# Patient Record
Sex: MALE | Race: WHITE | NOT HISPANIC OR LATINO | URBAN - METROPOLITAN AREA
[De-identification: names, ages, dates, MRNs, and addresses within clinical notes are randomized per-mention and may not be internally consistent; named-entity substitution may affect disease eponyms.]

---

## 2024-11-06 ENCOUNTER — APPOINTMENT (OUTPATIENT)
Dept: PRIMARY CARE | Facility: CLINIC | Age: 19
End: 2024-11-06
Payer: COMMERCIAL

## 2024-11-06 VITALS
DIASTOLIC BLOOD PRESSURE: 70 MMHG | RESPIRATION RATE: 16 BRPM | BODY MASS INDEX: 18.62 KG/M2 | WEIGHT: 133 LBS | TEMPERATURE: 97.5 F | HEART RATE: 92 BPM | SYSTOLIC BLOOD PRESSURE: 110 MMHG | HEIGHT: 71 IN

## 2024-11-06 DIAGNOSIS — Z00.01 ENCOUNTER FOR GENERAL ADULT MEDICAL EXAMINATION WITH ABNORMAL FINDINGS: Primary | ICD-10-CM

## 2024-11-06 DIAGNOSIS — M25.462 SWELLING OF JOINT OF LEFT KNEE: ICD-10-CM

## 2024-11-06 DIAGNOSIS — J45.20 MILD INTERMITTENT ASTHMA WITHOUT COMPLICATION (HHS-HCC): ICD-10-CM

## 2024-11-06 DIAGNOSIS — L25.5 RHUS DERMATITIS: ICD-10-CM

## 2024-11-06 DIAGNOSIS — Z30.09 VASECTOMY EVALUATION: ICD-10-CM

## 2024-11-06 PROBLEM — J45.909 ASTHMA: Status: ACTIVE | Noted: 2018-06-07

## 2024-11-06 PROCEDURE — 3008F BODY MASS INDEX DOCD: CPT | Performed by: FAMILY MEDICINE

## 2024-11-06 PROCEDURE — 4004F PT TOBACCO SCREEN RCVD TLK: CPT | Performed by: FAMILY MEDICINE

## 2024-11-06 PROCEDURE — 99214 OFFICE O/P EST MOD 30 MIN: CPT | Performed by: FAMILY MEDICINE

## 2024-11-06 RX ORDER — METHYLPREDNISOLONE 4 MG/1
TABLET ORAL
Qty: 21 TABLET | Refills: 0 | Status: SHIPPED | OUTPATIENT
Start: 2024-11-06 | End: 2024-11-13

## 2024-11-06 RX ORDER — CLOTRIMAZOLE AND BETAMETHASONE DIPROPIONATE 10; .64 MG/G; MG/G
1 CREAM TOPICAL 2 TIMES DAILY
Qty: 45 G | Refills: 1 | Status: SHIPPED | OUTPATIENT
Start: 2024-11-06 | End: 2024-12-04

## 2024-11-06 RX ORDER — ALBUTEROL SULFATE 90 UG/1
2 INHALANT RESPIRATORY (INHALATION) EVERY 4 HOURS PRN
Qty: 8.5 G | Refills: 11 | Status: SHIPPED | OUTPATIENT
Start: 2024-11-06 | End: 2025-11-06

## 2024-11-06 NOTE — ASSESSMENT & PLAN NOTE
Orders:    albuterol (ProAir HFA) 90 mcg/actuation inhaler; Inhale 2 puffs every 4 hours if needed for wheezing or shortness of breath.

## 2024-11-06 NOTE — PATIENT INSTRUCTIONS
USE [facility].  CALL FOR NEEDS [phone].   KEEP ON MEDICATIONS  KEEP SPECIALTY APPOINTMENTS.    USE LOTRISONE ANTI FUNGAL STEROID CREAM TO SOOTHE ITCH AND RASH.    DO NOT SCRATCH YOU MAY RUB.    LUKE WARM BATH AND DOVE SOAP MINIMAL AMOUNT.    SKIN EMOLLIENTS.    CALL IF NOT BETTER IN 1 WEEK:  11/13/24.    GET LEFT KNEE X RAYS: TENDER SWELLING MEDIAL LEFT FEMUR AT KNEE ? BURSITIS or LIPOMA or TENDONOPATHY   RETURN IN 2 WEEKS

## 2024-11-06 NOTE — PROGRESS NOTES
"Subjective   Patient ID: Jesus Lyles is a 19 y.o. male who presents for Poison Ivy (ARM AND STOMACH ) and Knee Injury (LEFT KNEE PAIN ).    Poison Claire       Poison Ivy (ARM AND STOMACH ) and Knee Injury (LEFT KNEE PAIN ).  SXS X 1 WEEK NOW AND CLEARING BUT SXS PERSIST AT LEFT ABD TO LEFT THIGH & GROIN.  ALSO IN INGUINAL SKIN FOLD.    BLANCHING SALMON PINK SKIN NOT DERMATOMAL AND SERPENTINE MARGIN.      STRESSOR ON INTERVAL 10/28/2024:  TWINS BORN AND NOW MOM AND BABIES ARE DOING OK.      APOLLOS BOY AND ATLAS GIRL.      MEDIAL LEFT KNEE MONTHS OF SWELLING AND TENDERNESS OVER THE DISTAL SARTORIUS MUSCLE APART FORM THE KNEE SO LESS LIKELY BURSAE.  TRY MEDROL DOSEPAK AND SOFT TISSUE  ULTRASOUND IMAGING NOW.      Review of Systems   All other systems reviewed and are negative.      Objective   /70   Pulse 92   Temp 36.4 °C (97.5 °F) (Temporal)   Resp 16   Ht 1.803 m (5' 11\")   Wt 60.3 kg (133 lb)   BMI 18.55 kg/m²     Physical Exam  Vitals and nursing note reviewed.   Constitutional:       Appearance: Normal appearance.      Comments: RASH AS ABOVE AND PT O/W APPEARS PALE FACED AND GAUNT.     HENT:      Head: Normocephalic.      Right Ear: Tympanic membrane, ear canal and external ear normal.      Left Ear: Tympanic membrane, ear canal and external ear normal.      Nose: Nose normal.      Mouth/Throat:      Mouth: Mucous membranes are moist.      Pharynx: Oropharynx is clear.   Eyes:      Conjunctiva/sclera: Conjunctivae normal.      Pupils: Pupils are equal, round, and reactive to light.   Cardiovascular:      Rate and Rhythm: Normal rate and regular rhythm.      Pulses: Normal pulses.      Heart sounds: Normal heart sounds.   Pulmonary:      Effort: Pulmonary effort is normal.      Breath sounds: Normal breath sounds.   Abdominal:      General: Bowel sounds are normal.      Palpations: Abdomen is soft.   Musculoskeletal:         General: Normal range of motion.      Cervical back: Normal range of " motion and neck supple.      Comments: PLUM SIZED TENDER SWELLING MEDIAL LEFT KNEE AT INSERTION OF SARTORIUS NEAR PES ENCERINUS   Skin:     General: Skin is warm and dry.   Neurological:      General: No focal deficit present.      Mental Status: Mental status is at baseline.   Psychiatric:         Mood and Affect: Mood normal.         Behavior: Behavior normal.         Thought Content: Thought content normal.         Judgment: Judgment normal.         Assessment/Plan   Assessment & Plan  Mild intermittent asthma without complication ([facility]-H[facility])    Orders:    albuterol (ProAir HFA) 90 mcg/actuation inhaler; Inhale 2 puffs every 4 hours if needed for wheezing or shortness of breath.    Encounter for general adult medical examination with abnormal findings    Orders:    Lipid panel; Future    Urinalysis with Reflex Microscopic; Future    TSH with reflex to Free T4 if abnormal; Future    Comprehensive Metabolic Panel; Future    CBC and Auto Differential; Future    Rhus dermatitis    Orders:    clotrimazole-betamethasone (Lotrisone) cream; Apply 1 Application topically 2 times a day for 28 days.    Follow Up In Primary Care - Established; Future    Swelling of joint of left knee    Orders:    XR knee left 3 views; Future    methylPREDNISolone (Medrol Dospak) 4 mg tablets; Take as directed on package.    Follow Up In Primary Care - Established; Future    Vasectomy evaluation    Orders:    Referral to Urology; Future

## 2024-11-21 ENCOUNTER — APPOINTMENT (OUTPATIENT)
Dept: PRIMARY CARE | Facility: CLINIC | Age: 19
End: 2024-11-21
Payer: COMMERCIAL

## 2024-12-06 ENCOUNTER — APPOINTMENT (OUTPATIENT)
Dept: PRIMARY CARE | Facility: CLINIC | Age: 19
End: 2024-12-06
Payer: COMMERCIAL

## 2025-06-13 ENCOUNTER — OFFICE VISIT (OUTPATIENT)
Dept: PRIMARY CARE | Facility: CLINIC | Age: 20
End: 2025-06-13
Payer: COMMERCIAL

## 2025-06-13 VITALS
RESPIRATION RATE: 16 BRPM | SYSTOLIC BLOOD PRESSURE: 112 MMHG | HEIGHT: 71 IN | DIASTOLIC BLOOD PRESSURE: 70 MMHG | TEMPERATURE: 97.7 F | WEIGHT: 135 LBS | BODY MASS INDEX: 18.9 KG/M2 | HEART RATE: 76 BPM

## 2025-06-13 DIAGNOSIS — J45.20 MILD INTERMITTENT ASTHMA WITHOUT COMPLICATION (HHS-HCC): ICD-10-CM

## 2025-06-13 DIAGNOSIS — Z13.220 NEED FOR LIPID SCREENING: ICD-10-CM

## 2025-06-13 DIAGNOSIS — Z79.1 NSAID LONG-TERM USE: ICD-10-CM

## 2025-06-13 DIAGNOSIS — M70.52 SUPRAPATELLAR BURSITIS OF LEFT KNEE: Primary | ICD-10-CM

## 2025-06-13 PROCEDURE — 3008F BODY MASS INDEX DOCD: CPT | Performed by: FAMILY MEDICINE

## 2025-06-13 PROCEDURE — 99213 OFFICE O/P EST LOW 20 MIN: CPT | Performed by: FAMILY MEDICINE

## 2025-06-13 RX ORDER — MELOXICAM 15 MG/1
15 TABLET ORAL DAILY
Qty: 14 TABLET | Refills: 0 | Status: SHIPPED | OUTPATIENT
Start: 2025-06-13 | End: 2025-06-27

## 2025-06-13 RX ORDER — OMEPRAZOLE 20 MG/1
20 CAPSULE, DELAYED RELEASE ORAL DAILY
Qty: 14 CAPSULE | Refills: 0 | Status: SHIPPED | OUTPATIENT
Start: 2025-06-13 | End: 2025-06-27

## 2025-06-13 RX ORDER — ALBUTEROL SULFATE 90 UG/1
2 INHALANT RESPIRATORY (INHALATION) EVERY 4 HOURS PRN
Qty: 8.5 G | Refills: 11 | Status: SHIPPED | OUTPATIENT
Start: 2025-06-13 | End: 2026-06-13

## 2025-06-13 NOTE — PATIENT INSTRUCTIONS
USE [facility].  CALL FOR NEEDS [phone].   KEEP ON MEDICATIONS  KEEP SPECIALTY APPOINTMENTS.    I DECIDED NOT TO INJECT YOUR LEFT KNEE INSTEAD WILL ORDER:  1-- ANTI INFLAMMATORIES: MELOXICAM 15 MG IS AN NSAID. LIKE NAPROXEN OR ASPIRIN IBUPROFEN, MOTRIN. TAKE NO OTHER NSAID WITH THIS.  TYLENOL IS OK.  TAKE DAILY WITH FOOD FOR 2 WEEKS FOR LEFT KNEE PAIN AND SWELLING RELIEF.   21--NEOPRENE LEFT KNEE SLEEVE  RETURN IN 2 WEEKS FOR FOLLOW UP   RETURN SOONER FOR ANY WORSE PAIN.

## 2025-06-13 NOTE — PROGRESS NOTES
"Subjective   Patient ID: Jesus Lyles is a 20 y.o. male who presents for Joint Swelling (LEFT KNEE SWELLING AND PAIN).    HPI   Joint Swelling (LEFT KNEE SWELLING AND PAIN).    SXS STARTED:  AWHILE NOW - ? YRS AND CANNOT BEND DOWN NOW OR BEND IT, NO ACCIDENTS.  NO RECENT INJURIES.  JUST POPPED UP OUT OF NO WHERE.    NO RELIEVERS.    WHEN DTR HIT IS PAIN SHOOTS THRU ME.      SEPT 2024 VASECTOMY REFERRAL TO DR ROUSSEAU NOT DONE.  INSURANCE MESSED UP AND NO MRI KNEE EITHER.      1 YR OLD DTR AND 7-8 M OLD TWINS.      XRAY LEFT KNEE NOV 2024:  MEDROL +/- HELPED.      WORKS THIRD SHIFT.      Review of Systems   All other systems reviewed and are negative.      Objective   /70   Pulse 76   Temp 36.5 °C (97.7 °F) (Temporal)   Resp 16   Ht 1.803 m (5' 11\")   Wt 61.2 kg (135 lb)   BMI 18.83 kg/m²   PROMINENT EMBOSSED FOREHEAD AND VERY PROMINENT EYEBROW RIDGE BILAT.  ?  BONY OVERGROWTHS?       Physical Exam  Vitals and nursing note reviewed.   Constitutional:       Appearance: Normal appearance.   HENT:      Head: Normocephalic.      Right Ear: Tympanic membrane, ear canal and external ear normal.      Left Ear: Tympanic membrane, ear canal and external ear normal.      Nose: Nose normal.      Mouth/Throat:      Mouth: Mucous membranes are moist.      Pharynx: Oropharynx is clear.   Eyes:      Conjunctiva/sclera: Conjunctivae normal.      Pupils: Pupils are equal, round, and reactive to light.   Cardiovascular:      Rate and Rhythm: Normal rate and regular rhythm.      Pulses: Normal pulses.      Heart sounds: Normal heart sounds.   Pulmonary:      Effort: Pulmonary effort is normal.      Breath sounds: Normal breath sounds.   Abdominal:      General: Bowel sounds are normal.      Palpations: Abdomen is soft.   Musculoskeletal:         General: Normal range of motion.      Cervical back: Normal range of motion and neck supple.      Comments: BONY AND THIN WM NT NAD AND SOFT FULLNESS MEDIAL LEFT KNEE AT DISTAL " FEMUR.  DEEP TO PATELLAR TENDON.  TENDER TO PALPATION NO REDNESS AND NO HEAT.     Skin:     General: Skin is warm and dry.   Neurological:      General: No focal deficit present.      Mental Status: Mental status is at baseline.   Psychiatric:         Mood and Affect: Mood normal.         Behavior: Behavior normal.         Thought Content: Thought content normal.         Judgment: Judgment normal.         Assessment/Plan   Assessment & Plan  Need for lipid screening    Orders:    Lipid panel; Future    Mild intermittent asthma without complication ([facility]-HCC)    Orders:    albuterol (ProAir HFA) 90 mcg/actuation inhaler; Inhale 2 puffs every 4 hours if needed for wheezing or shortness of breath.    Suprapatellar bursitis of left knee    Orders:    Knee Sleeve    meloxicam (Mobic) 15 mg tablet; Take 1 tablet (15 mg) by mouth once daily for 14 days. THIS IS AN NSAID.  NO OTHER NSAID WITH THIS.  TYLENOL IS OK.  TAKE DAILY WITH FOOD FOR 2 WEEKS FOR LEFT KNEE PAIN AND SWELLING RELIEF.    omeprazole (PriLOSEC) 20 mg DR capsule; Take 1 capsule (20 mg) by mouth once daily for 14 days. Do not crush or chew.    Follow Up In Primary Care - Established; Future    NSAID long-term use    Orders:    omeprazole (PriLOSEC) 20 mg DR capsule; Take 1 capsule (20 mg) by mouth once daily for 14 days. Do not crush or chew.

## 2025-06-27 ENCOUNTER — APPOINTMENT (OUTPATIENT)
Dept: PRIMARY CARE | Facility: CLINIC | Age: 20
End: 2025-06-27
Payer: COMMERCIAL

## 2025-06-27 VITALS
RESPIRATION RATE: 16 BRPM | TEMPERATURE: 97.1 F | SYSTOLIC BLOOD PRESSURE: 124 MMHG | HEIGHT: 71 IN | WEIGHT: 135 LBS | HEART RATE: 78 BPM | BODY MASS INDEX: 18.9 KG/M2 | DIASTOLIC BLOOD PRESSURE: 78 MMHG

## 2025-06-27 DIAGNOSIS — M70.52 SUPRAPATELLAR BURSITIS OF LEFT KNEE: Primary | ICD-10-CM

## 2025-06-27 PROCEDURE — 3008F BODY MASS INDEX DOCD: CPT | Performed by: FAMILY MEDICINE

## 2025-06-27 PROCEDURE — 99213 OFFICE O/P EST LOW 20 MIN: CPT | Performed by: FAMILY MEDICINE

## 2025-06-27 RX ORDER — MELOXICAM 15 MG/1
15 TABLET ORAL DAILY
Qty: 60 TABLET | Refills: 0 | Status: SHIPPED | OUTPATIENT
Start: 2025-06-27 | End: 2025-08-26

## 2025-06-27 NOTE — PROGRESS NOTES
"Subjective   Patient ID: Jesus Lyles is a 20 y.o. male who presents for Knee Pain (Left knee pain).    Knee Pain        Knee Pain (Left knee pain).  FEELING A LITTLE BETTER PAIN PERSISTS THOUGH WHEN HE BEARS WT.    MOBIC HELPS.    NO FALLS NO INJURIES.      NOV 2024: XRAY LEFT KNEE:  not done.    TENDER SWELLING MEDIAL LEFT FEMUR AT KNEE ? BURSITIS  or LIPOMA or TENDONOPATHY   Dx: Swelling of joint of left knee [M25.462 (ICD-10-CM)]     WILL REFER TO ORTHO FOR LONG STANDING PERSISTENT LEFT KNEE PAINS EVAL AND TREAT.      Review of Systems   All other systems reviewed and are negative.      Objective   /78   Pulse 78   Temp 36.2 °C (97.1 °F) (Temporal)   Resp 16   Ht 1.803 m (5' 11\")   Wt 61.2 kg (135 lb)   BMI 18.83 kg/m²     Physical Exam  Vitals and nursing note reviewed.   Constitutional:       Appearance: Normal appearance.   HENT:      Head: Normocephalic.      Right Ear: Tympanic membrane, ear canal and external ear normal.      Left Ear: Tympanic membrane, ear canal and external ear normal.      Nose: Nose normal.      Mouth/Throat:      Mouth: Mucous membranes are moist.      Pharynx: Oropharynx is clear.   Eyes:      Conjunctiva/sclera: Conjunctivae normal.      Pupils: Pupils are equal, round, and reactive to light.   Cardiovascular:      Rate and Rhythm: Normal rate and regular rhythm.      Pulses: Normal pulses.      Heart sounds: Normal heart sounds.   Pulmonary:      Effort: Pulmonary effort is normal.      Breath sounds: Normal breath sounds.   Abdominal:      General: Bowel sounds are normal.      Palpations: Abdomen is soft.   Musculoskeletal:         General: Normal range of motion.      Cervical back: Normal range of motion and neck supple.      Comments: BOGGINESS JAYDEN;AT MEDIAL KNEES BUT TENDER AT MEDIAL LEFT JOINT LINE.     Skin:     General: Skin is warm and dry.   Neurological:      General: No focal deficit present.      Mental Status: Mental status is at baseline. "   Psychiatric:         Mood and Affect: Mood normal.         Behavior: Behavior normal.         Thought Content: Thought content normal.         Judgment: Judgment normal.         Assessment/Plan   Assessment & Plan  Suprapatellar bursitis of left knee    Orders:    Follow Up In Primary Care - Established    Referral to Orthopedics and Sports Medicine; Future    Follow Up In Primary Care - Established; Future    meloxicam (Mobic) 15 mg tablet; Take 1 tablet (15 mg) by mouth once daily. THIS IS AN NSAID.  NO OTHER NSAID WITH THIS.  TYLENOL IS OK.  TAKE DAILY WITH FOOD FOR 2 WEEKS FOR LEFT KNEE PAIN AND SWELLING RELIEF.

## 2025-06-27 NOTE — PATIENT INSTRUCTIONS
USE [facility].  CALL FOR NEEDS [phone].   KEEP ON MEDICATIONS  KEEP SPECIALTY APPOINTMENTS.    REFER TO  ORTHO FOR LONG STANDING PERSISTENT LEFT KNEE PAINS EVAL AND TREAT. CALL DR PENA [phone].

## 2025-07-18 DIAGNOSIS — M25.562 LEFT KNEE PAIN, UNSPECIFIED CHRONICITY: ICD-10-CM

## 2025-07-22 ENCOUNTER — HOSPITAL ENCOUNTER (OUTPATIENT)
Dept: RADIOLOGY | Facility: HOSPITAL | Age: 20
Discharge: HOME | End: 2025-07-22
Payer: COMMERCIAL

## 2025-07-22 ENCOUNTER — OFFICE VISIT (OUTPATIENT)
Dept: ORTHOPEDIC SURGERY | Facility: HOSPITAL | Age: 20
End: 2025-07-22
Payer: COMMERCIAL

## 2025-07-22 VITALS — HEIGHT: 71 IN | WEIGHT: 135 LBS | BODY MASS INDEX: 18.9 KG/M2

## 2025-07-22 DIAGNOSIS — M25.562 LEFT KNEE PAIN, UNSPECIFIED CHRONICITY: ICD-10-CM

## 2025-07-22 DIAGNOSIS — M76.892 TENDINITIS OF LEFT QUADRICEPS TENDON: Primary | ICD-10-CM

## 2025-07-22 DIAGNOSIS — R52 PAIN: ICD-10-CM

## 2025-07-22 PROCEDURE — 99202 OFFICE O/P NEW SF 15 MIN: CPT

## 2025-07-22 PROCEDURE — 3008F BODY MASS INDEX DOCD: CPT

## 2025-07-22 PROCEDURE — 99204 OFFICE O/P NEW MOD 45 MIN: CPT

## 2025-07-22 PROCEDURE — 73560 X-RAY EXAM OF KNEE 1 OR 2: CPT | Mod: RT

## 2025-07-22 PROCEDURE — 73562 X-RAY EXAM OF KNEE 3: CPT | Mod: LT

## 2025-07-22 ASSESSMENT — PAIN - FUNCTIONAL ASSESSMENT: PAIN_FUNCTIONAL_ASSESSMENT: 0-10

## 2025-07-22 ASSESSMENT — PAIN SCALES - GENERAL: PAINLEVEL_OUTOF10: 6

## 2025-07-22 NOTE — PROGRESS NOTES
New patient - left knee pain x2 years   States that the pain is the worse when weightbearing on it  No surgery / no known injury   Xray done

## 2025-07-22 NOTE — PROGRESS NOTES
PRIMARY CARE PHYSICIAN: Cooper Soni MD  REFERRING PROVIDER: Cooper Soni MD  [address] [id number]     ORTHOPAEDIC CONSULT: Knee Evaluation        ASSESSMENT & PLAN    IMPRESSION:   Left vastus medialis tendinitis    PLAN:   - Will start with a referral to physical therapy for left vastus medialis/quadriceps tendinitis.  - Patient's symptoms do not seem consistent with a bursitis etiology.  He was more active last summer/fall when his symptoms first onset, however he is not an athlete or runner.  He has done multiple rounds of oral steroids with little to no relief of his symptoms.  He has tried knee sleeve without relief as well.  - May take Tylenol/ibuprofen as needed for pain in the meantime.  - Plan following up in 6 weeks after completion of physical therapy.  If patient still having symptoms, may discuss with a MRI for further workup/evaluation.      SUBJECTIVE  CHIEF COMPLAINT: No chief complaint on file.       HPI: Jesus Lyles is a 20 y.o. patient. Jesus Lyles has had progressive problems with their left knee over the past 1 year. They do not report any trauma. They do not report any constant or progressive numbness or tingling in their legs. Their symptoms are interfering with activities which include from a seated position, squatting, resisted leg extension.     FUNCTIONAL STATUS: occasionally limited.  AMBULATORY STATUS: Independent community ambulation without devices  PREVIOUS TREATMENTS: NSAIDS ibuprofen with no improvement  Bracing: knee sleeve with no improvement  Multiple rounds of oral steroids without relief  HISTORY OF SURGERY ON AFFECTED KNEE(S): No       REVIEW OF SYSTEMS  Constitutional: See HPI for pain assessment, No significant weight loss, recent trauma  Cardiovascular: No chest pain, shortness of breath  Respiratory: No difficulty breathing, cough  Gastrointestinal: No nausea, vomiting, diarrhea, constipation  Musculoskeletal: Noted in  HPI  Integumentary: No rashes, easy bruising, redness   Neurological: no numbness or tingling in extremities, no gait disturbances   Psychiatric: No mood changes, memory changes, social issues  Heme/Lymph: no excessive swelling, easy bruising, excessive bleeding  ENT: No hearing changes  Eyes: No vision changes    Medical History[1]     Allergies[2]     Surgical History[3]     Family History[4]     Social History     Socioeconomic History    Marital status: Single     Spouse name: Not on file    Number of children: Not on file    Years of education: Not on file    Highest education level: Not on file   Occupational History    Occupation: [employer]   Tobacco Use    Smoking status: Every Day     Current packs/day: 0.25     Average packs/day: 0.3 packs/day for 8.6 years (2.1 ttl pk-yrs)     Types: Cigarettes     Start date: 2017    Smokeless tobacco: Never    Tobacco comments:     06 NOV 2024: REC REDUCE USE AND OR QUIT SMOKING AND VAPING AND AVOID 1-2-3-4 HAND SMOKE EXPOSURE.     Vaping Use    Vaping status: Every Day    Start date: 10/1/2019    Substances: Nicotine, THC, CBD, Flavoring    Devices: Disposable    Passive vaping exposure: Yes   Substance and Sexual Activity    Alcohol use: Not Currently    Drug use: Yes     Types: Marijuana    Sexual activity: Not Currently   Other Topics Concern    Not on file   Social History Narrative    Not on file     Social Drivers of Health     Financial Resource Strain: Not on file   Food Insecurity: Not on file   Transportation Needs: Not on file   Physical Activity: Not on file   Stress: Not on file   Social Connections: Not on file   Intimate Partner Violence: Not on file   Housing Stability: Not on file        CURRENT MEDICATIONS:   Current Medications[5]     OBJECTIVE    PHYSICAL EXAM      11/6/2024     9:55 AM 6/13/2025     3:07 PM 6/27/2025     2:54 PM   Vitals   Systolic 110 112 124   Diastolic 70 70 78   Heart Rate 92 76 78   Temp 36.4 °C (97.5 °F) 36.5 °C  "(97.7 °F) 36.2 °C (97.1 °F)   Resp 16 16 16   Height 1.803 m (5' 11\") 1.803 m (5' 11\") 1.803 m (5' 11\")   Weight (lb) 133 135 135   BMI 18.55 kg/m2 18.83 kg/m2 18.83 kg/m2   BSA (m2) 1.74 m2 1.75 m2 1.75 m2   Visit Report Report Report Report      There is no height or weight on file to calculate BMI.    GENERAL: A/Ox3, NAD. Appears healthy, well nourished  PSYCHIATRIC: Mood stable, appropriate memory recall  EYES: EOM intact  CARDIAC: regular rate  LUNGS: Breathing non-labored  SKIN: no erythema, rashes, or ecchymoses   MUSCULOSKELETAL:  Laterality: left Knee Exam  - Alignment: Neutral alignment  - ROM: 0-120+  - Effusion: none  - Strength: knee extension and flexion 5/5, EHL/PF/DF motor intact  - Palpation: TTP along vastus medialis tendon.  Nontender to palpation over MCL/LCL or patellar tendon  - Stability: Anterior/Posterior stable, varus/valgus stable  - Gait: normal  - Hip Exam: flexion to 100+ degrees, full extension, internal/external rotation adequate, and no pain with log roll  - Special Tests: Negative Abigail and Lachman  NEUROVASCULAR:  - Neurovascular Status: sensation intact to light touch distally      IMAGING:  Multiple views of the affected left knee(s) demonstrate: Unremarkable left knee radiographs.  Well-preserved joint spaces.  No effusion.  No fracture or dislocation.   X-rays were personally reviewed and interpreted by me.  Radiology reports were reviewed by me as well, if readily available at the time.        Amber Santos PA-C  Physician Assistant  Orthopedic Surgery  [facility]         [1]   Past Medical History:  Diagnosis Date    Asthma    [2] No Known Allergies  [3]   Past Surgical History:  Procedure Laterality Date    CRANIOPLASTY FOR CRANIAL DEFECT     [4]   Family History  Problem Relation Name Age of Onset    Migraines Mother      Migraines Father     [5]   Current Outpatient Medications   Medication Sig Dispense Refill    albuterol (ProAir " HFA) 90 mcg/actuation inhaler Inhale 2 puffs every 4 hours if needed for wheezing or shortness of breath. 8.5 g 11    meloxicam (Mobic) 15 mg tablet Take 1 tablet (15 mg) by mouth once daily. THIS IS AN NSAID.  NO OTHER NSAID WITH THIS.  TYLENOL IS OK.  TAKE DAILY WITH FOOD FOR 2 WEEKS FOR LEFT KNEE PAIN AND SWELLING RELIEF. 60 tablet 0    omeprazole (PriLOSEC) 20 mg DR capsule Take 1 capsule (20 mg) by mouth once daily for 14 days. Do not crush or chew. 14 capsule 0     No current facility-administered medications for this visit.

## 2025-08-05 ENCOUNTER — EVALUATION (OUTPATIENT)
Dept: PHYSICAL THERAPY | Facility: HOSPITAL | Age: 20
End: 2025-08-05
Payer: COMMERCIAL

## 2025-08-05 DIAGNOSIS — R29.898 LEFT LEG WEAKNESS: ICD-10-CM

## 2025-08-05 DIAGNOSIS — R26.9 GAIT ABNORMALITY: Primary | ICD-10-CM

## 2025-08-05 PROCEDURE — 97161 PT EVAL LOW COMPLEX 20 MIN: CPT | Mod: GP | Performed by: PHYSICAL THERAPIST

## 2025-08-05 PROCEDURE — 97110 THERAPEUTIC EXERCISES: CPT | Mod: GP | Performed by: PHYSICAL THERAPIST

## 2025-08-05 ASSESSMENT — PAIN - FUNCTIONAL ASSESSMENT: PAIN_FUNCTIONAL_ASSESSMENT: 0-10

## 2025-08-05 ASSESSMENT — PAIN SCALES - GENERAL: PAINLEVEL_OUTOF10: 5 - MODERATE PAIN

## 2025-08-05 ASSESSMENT — PATIENT HEALTH QUESTIONNAIRE - PHQ9
SUM OF ALL RESPONSES TO PHQ9 QUESTIONS 1 AND 2: 0
2. FEELING DOWN, DEPRESSED OR HOPELESS: NOT AT ALL
1. LITTLE INTEREST OR PLEASURE IN DOING THINGS: NOT AT ALL

## 2025-08-05 ASSESSMENT — ENCOUNTER SYMPTOMS
DEPRESSION: 0
LOSS OF SENSATION IN FEET: 0
OCCASIONAL FEELINGS OF UNSTEADINESS: 0

## 2025-08-05 NOTE — PROGRESS NOTES
Physical Therapy    Physical Therapy Evaluation    Patient Name: Jesus Lyles  MRN: [id number]  : 2005  Referring Physician: Amber Santos  Today's Date: 2025  Time Calculation  Start Time: 830  Stop Time: 907  Time Calculation (min): 37 min  PT Evaluation Time Entry  PT Evaluation (Low) Time Entry: 27  PT Therapeutic Procedures Time Entry  Therapeutic Exercise Time Entry: 10           General       Current Problem  Problem List Items Addressed This Visit           ICD-10-CM    Gait abnormality - Primary R26.9    Relevant Orders    Follow Up In Physical Therapy    Left leg weakness R29.898    Relevant Orders    Follow Up In Physical Therapy          SUBJECTIVE  Subjective   Patient reports left knee pain that began insidiously approximately one year ago . This is leading to difficulty with sit to stand, squatting and stair negotiation. Pain is reported to range 0-5/10 with daily activities.  Patient states that their goal is to return to previous level of function  with physical therapy intervention.  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    Prior level of function: No functional limits    Patient's name and  were confirmed this date.        Precautions  Precautions  STEADI Fall Risk Score (The score of 4 or more indicates an increased risk of falling): 0  Precautions Comment: none       Pain  Pain Assessment: 0-10  0-10 (Numeric) Pain Score: 5 - Moderate pain  Pain Location: Knee  Pain Orientation: Left        OBJECTIVE:    Lower Extremity Strength:  LE strength not listed below is WNL  MMT 5/5 max  LEFT RIGHT   Hip Flexion  4/5  4+/5        Hip Abduction 4+/5  4+/5   Hip Adduction  4/5  4+/5   Knee Extension  4-/5 4+/5   Knee Flexion 4+/5 4+/5                         Lower Extremity ROM:   Jeremy LE AROM is WNL      Hs flexibility 50* jeremy          Gait mechanics: antalgic gait with decreased knee ext during stance phase    Palpation TTP at left  VMO    Special tests:Negative left knee varus and valus stress test, negative     Other Measures  Lower Extremity Funtional Score (LEFS): 40       TREATMENT:    EXERCISES 8/5/25  Date Date Date   VISIT # # 1 # # #   HEP 10'             Nustep       Bike              Shuttle  DLP       Shuttle SLP       Shuttle TR/HR              Qhip Flexion       Qhip Extension       Qhip Abduction       Qhip  TKE              Mini squat       Mini lunge       3 way LE raise                     Manual                                                                                 PATIENT EDUCATION:  Access Code: [email or web address]  URL: [email or web address]  Date: 08/05/2025  Prepared by: Flex Olivares    Exercises  - Supine Heel Slide  - 3 x daily - 7 x weekly - 20 reps - 2 seconds hold  - Seated Hamstring Stretch  - 3 x daily - 7 x weekly - 3 reps - 30 seconds hold    ASSESSEMENT  Pt. Presents with pain about VMO as well as left LE weakness and hs tightness leading to faulty gait pattern    Rehab potential: excellent    PLAN  Treatment/Interventions: Therapeutic activities, Therapeutic exercises, Gait training  PT Plan: Skilled PT  PT Frequency: 2 times per week  Duration: 6 weeks    Pt. Is in agreement with PT plan.  Goals:  Active       PT Problem       PT Goal 1       Start:  08/05/25    Expected End:  09/02/25       Pt's left  LE strength will improve to  4+/5 throughout to allow for improved gait safety          PT Goal 2       Start:  08/05/25    Expected End:  09/16/25       Pt. will present with a normalized gait pattern for improved gait safety          Patient Stated Goal 1       Start:  08/05/25    Expected End:  09/16/25       Pt's stated goal is to return to previous level of function

## 2025-08-13 ENCOUNTER — TREATMENT (OUTPATIENT)
Dept: PHYSICAL THERAPY | Facility: HOSPITAL | Age: 20
End: 2025-08-13
Payer: COMMERCIAL

## 2025-08-13 DIAGNOSIS — R26.9 GAIT ABNORMALITY: Primary | ICD-10-CM

## 2025-08-13 DIAGNOSIS — R29.898 LEFT LEG WEAKNESS: ICD-10-CM

## 2025-08-13 PROCEDURE — 97110 THERAPEUTIC EXERCISES: CPT | Mod: GP | Performed by: PHYSICAL THERAPIST

## 2025-08-15 ENCOUNTER — DOCUMENTATION (OUTPATIENT)
Dept: PHYSICAL THERAPY | Facility: HOSPITAL | Age: 20
End: 2025-08-15
Payer: COMMERCIAL

## 2025-08-15 ENCOUNTER — APPOINTMENT (OUTPATIENT)
Dept: PHYSICAL THERAPY | Facility: HOSPITAL | Age: 20
End: 2025-08-15
Payer: COMMERCIAL

## 2025-08-15 DIAGNOSIS — R26.9 GAIT ABNORMALITY: Primary | ICD-10-CM

## 2025-08-15 DIAGNOSIS — R29.898 LEFT LEG WEAKNESS: ICD-10-CM

## 2025-09-02 ENCOUNTER — APPOINTMENT (OUTPATIENT)
Dept: ORTHOPEDIC SURGERY | Facility: HOSPITAL | Age: 20
End: 2025-09-02
Payer: COMMERCIAL